# Patient Record
Sex: MALE | URBAN - METROPOLITAN AREA
[De-identification: names, ages, dates, MRNs, and addresses within clinical notes are randomized per-mention and may not be internally consistent; named-entity substitution may affect disease eponyms.]

---

## 2022-07-17 ENCOUNTER — HOSPITAL ENCOUNTER (OUTPATIENT)
Dept: TELEMEDICINE | Facility: HOSPITAL | Age: 77
Discharge: HOME | End: 2022-07-17

## 2022-07-17 PROCEDURE — 99499 UNLISTED E&M SERVICE: CPT | Mod: ,,, | Performed by: PSYCHIATRY & NEUROLOGY

## 2022-07-17 PROCEDURE — 99499 NO LOS: ICD-10-PCS | Mod: ,,, | Performed by: PSYCHIATRY & NEUROLOGY

## 2022-07-17 NOTE — CONSULTS
Spoke cart unavailable despite rebooting. No answer after reboot. Converted to phone consult.    76M w/ hx of stroke w/ R sided weakness, last night he had increased coughing spells and gurgling noises, went to sleep 10pm, increased slurring of speech noticed throughout the night worse than baseline.   Exam per ED is right sided weakness and dysarthria worse from baseline.  Out of window for thrombolytic therapy, ddx includes new subacute stroke (CT w/o acute changes) vs. Recrudescence in setting of metabolic or infectious process.  Spoke to workup with [facility] if no clear triggers.